# Patient Record
(demographics unavailable — no encounter records)

---

## 2019-06-28 NOTE — DIAGNOSTIC IMAGING REPORT
PROCEDURE: CT head without contrast.



TECHNIQUE: Multiple contiguous axial images were obtained through

the brain without the use of intravenous contrast. Auto Exposure

Controls were utilized during the CT exam to meet ALARA standards

for radiation dose reduction. 



INDICATION:  Head injury. Headaches. Blurred vision. 



COMPARISON: None.



FINDINGS: No intracranial hemorrhage, mass effect, hydrocephalus

or extra-axial fluid collections. No CT evidence of a territorial

infarction. Osseous structures are intact. The visualized

paranasal sinuses and mastoids are clear.



IMPRESSION: No acute intracranial CT findings.



Dictated by: 



  Dictated on workstation # [id number]